# Patient Record
Sex: FEMALE | Race: BLACK OR AFRICAN AMERICAN | NOT HISPANIC OR LATINO | URBAN - METROPOLITAN AREA
[De-identification: names, ages, dates, MRNs, and addresses within clinical notes are randomized per-mention and may not be internally consistent; named-entity substitution may affect disease eponyms.]

---

## 2023-06-05 ENCOUNTER — EMERGENCY (EMERGENCY)
Facility: HOSPITAL | Age: 38
LOS: 1 days | Discharge: HOME | End: 2023-06-05
Attending: STUDENT IN AN ORGANIZED HEALTH CARE EDUCATION/TRAINING PROGRAM | Admitting: STUDENT IN AN ORGANIZED HEALTH CARE EDUCATION/TRAINING PROGRAM
Payer: MEDICAID

## 2023-06-05 VITALS
SYSTOLIC BLOOD PRESSURE: 128 MMHG | DIASTOLIC BLOOD PRESSURE: 89 MMHG | OXYGEN SATURATION: 97 % | TEMPERATURE: 99 F | HEART RATE: 99 BPM | RESPIRATION RATE: 18 BRPM

## 2023-06-05 PROCEDURE — 99283 EMERGENCY DEPT VISIT LOW MDM: CPT

## 2023-06-05 PROCEDURE — 93010 ELECTROCARDIOGRAM REPORT: CPT

## 2023-06-05 NOTE — ED PROVIDER NOTE - PATIENT PORTAL LINK FT
You can access the FollowMyHealth Patient Portal offered by [facility] by registering at the following website: [email or web address]. By joining [employer]’s FollowMyHealth portal, you will also be able to view your health information using other applications (apps) compatible with our system.

## 2023-06-05 NOTE — ED PROVIDER NOTE - CLINICAL SUMMARY MEDICAL DECISION MAKING FREE TEXT BOX
Patient is a 38-year-old female past medical history of asthma, intubated during COVID in 2021 who is presenting to the emergency department with feelings of shortness of breath.  Patient had what she says is an anxiety attack earlier today.  Not on any medications for anxiety.  Patient has no suicidal ideation no homicidal ideation, does not feel depressed.  She called EMS and EMS recommended that she come to the ER for evaluation.  Patient has no wheezing on exam lungs clear to auscultation bilaterally, regular rate and rhythm no murmurs rubs or gallops.  Patient is calm and well-appearing.  Cognition intact, patient is neurologically intact ambulatory without ataxia, no respiratory distress.  Patient was offered medication for anxiety, but does not want medication at this time.  Given crisis center follow-up.  Although, patient does have a psychiatrist who she has consulted with.  Return precautions reviewed.

## 2023-06-05 NOTE — ED ADULT TRIAGE NOTE - NS ED NURSE BANDS TYPE
Triage Assessment     Row Name 11/23/22 0952       Triage Assessment (Adult)    Airway WDL WDL       Respiratory WDL    Respiratory WDL WDL       Skin Circulation/Temperature WDL    Skin Circulation/Temperature WDL WDL       Cognitive/Neuro/Behavioral WDL    Cognitive/Neuro/Behavioral WDL WDL               Name band;

## 2023-06-05 NOTE — ED ADULT TRIAGE NOTE - CHIEF COMPLAINT QUOTE
c/o SOB and an anxiety attack due to inhaler not working, pt reports was intubated during COVID. denies S/I, H/I or AVH, breathing is even and unlabored, congested non productive cough noted.